# Patient Record
Sex: FEMALE | Race: WHITE | Employment: FULL TIME | URBAN - METROPOLITAN AREA
[De-identification: names, ages, dates, MRNs, and addresses within clinical notes are randomized per-mention and may not be internally consistent; named-entity substitution may affect disease eponyms.]

---

## 2022-08-14 ENCOUNTER — HOSPITAL ENCOUNTER (EMERGENCY)
Age: 27
Discharge: HOME | End: 2022-08-14
Attending: EMERGENCY MEDICINE
Payer: COMMERCIAL

## 2022-08-14 ENCOUNTER — APPOINTMENT (OUTPATIENT)
Dept: GENERAL RADIOLOGY | Age: 27
End: 2022-08-14
Payer: COMMERCIAL

## 2022-08-14 VITALS
BODY MASS INDEX: 43.34 KG/M2 | DIASTOLIC BLOOD PRESSURE: 65 MMHG | HEIGHT: 59 IN | OXYGEN SATURATION: 100 % | SYSTOLIC BLOOD PRESSURE: 110 MMHG | RESPIRATION RATE: 20 BRPM | TEMPERATURE: 97.7 F | WEIGHT: 215 LBS | HEART RATE: 64 BPM

## 2022-08-14 DIAGNOSIS — R19.7 DIARRHEA, UNSPECIFIED TYPE: Primary | ICD-10-CM

## 2022-08-14 DIAGNOSIS — K52.9 COLITIS: ICD-10-CM

## 2022-08-14 LAB
ALBUMIN SERPL-MCNC: 4 G/DL (ref 3.5–5.2)
ALP BLD-CCNC: 98 U/L (ref 35–104)
ALT SERPL-CCNC: 12 U/L (ref 0–32)
ANION GAP SERPL CALCULATED.3IONS-SCNC: 11 MMOL/L (ref 7–16)
AST SERPL-CCNC: 16 U/L (ref 0–31)
BACTERIA: ABNORMAL /HPF
BASOPHILS ABSOLUTE: 0.04 E9/L (ref 0–0.2)
BASOPHILS RELATIVE PERCENT: 0.5 % (ref 0–2)
BILIRUB SERPL-MCNC: 0.3 MG/DL (ref 0–1.2)
BILIRUBIN URINE: NEGATIVE
BLOOD, URINE: NEGATIVE
BUN BLDV-MCNC: 12 MG/DL (ref 6–20)
CALCIUM SERPL-MCNC: 9.6 MG/DL (ref 8.6–10.2)
CHLORIDE BLD-SCNC: 99 MMOL/L (ref 98–107)
CLARITY: CLEAR
CO2: 27 MMOL/L (ref 22–29)
COLOR: YELLOW
CREAT SERPL-MCNC: 0.7 MG/DL (ref 0.5–1)
EOSINOPHILS ABSOLUTE: 0.03 E9/L (ref 0.05–0.5)
EOSINOPHILS RELATIVE PERCENT: 0.4 % (ref 0–6)
EPITHELIAL CELLS, UA: ABNORMAL /HPF
GFR AFRICAN AMERICAN: >60
GFR NON-AFRICAN AMERICAN: >60 ML/MIN/1.73
GLUCOSE BLD-MCNC: 120 MG/DL (ref 74–99)
GLUCOSE URINE: NEGATIVE MG/DL
HCG, URINE, POC: NEGATIVE
HCT VFR BLD CALC: 43.9 % (ref 34–48)
HEMOGLOBIN: 14.3 G/DL (ref 11.5–15.5)
IMMATURE GRANULOCYTES #: 0.02 E9/L
IMMATURE GRANULOCYTES %: 0.2 % (ref 0–5)
KETONES, URINE: NEGATIVE MG/DL
LEUKOCYTE ESTERASE, URINE: NEGATIVE
LYMPHOCYTES ABSOLUTE: 1.86 E9/L (ref 1.5–4)
LYMPHOCYTES RELATIVE PERCENT: 21.8 % (ref 20–42)
Lab: NORMAL
MCH RBC QN AUTO: 30 PG (ref 26–35)
MCHC RBC AUTO-ENTMCNC: 32.6 % (ref 32–34.5)
MCV RBC AUTO: 92.2 FL (ref 80–99.9)
MONOCYTES ABSOLUTE: 0.46 E9/L (ref 0.1–0.95)
MONOCYTES RELATIVE PERCENT: 5.4 % (ref 2–12)
NEGATIVE QC PASS/FAIL: NORMAL
NEUTROPHILS ABSOLUTE: 6.14 E9/L (ref 1.8–7.3)
NEUTROPHILS RELATIVE PERCENT: 71.7 % (ref 43–80)
NITRITE, URINE: NEGATIVE
PDW BLD-RTO: 12.8 FL (ref 11.5–15)
PH UA: 5.5 (ref 5–9)
PLATELET # BLD: 259 E9/L (ref 130–450)
PMV BLD AUTO: 9.8 FL (ref 7–12)
POSITIVE QC PASS/FAIL: NORMAL
POTASSIUM SERPL-SCNC: 3.8 MMOL/L (ref 3.5–5)
PROTEIN UA: NEGATIVE MG/DL
RBC # BLD: 4.76 E12/L (ref 3.5–5.5)
RBC UA: ABNORMAL /HPF (ref 0–2)
SODIUM BLD-SCNC: 137 MMOL/L (ref 132–146)
SPECIFIC GRAVITY UA: >=1.03 (ref 1–1.03)
TOTAL PROTEIN: 7.8 G/DL (ref 6.4–8.3)
UROBILINOGEN, URINE: 0.2 E.U./DL
WBC # BLD: 8.6 E9/L (ref 4.5–11.5)
WBC UA: ABNORMAL /HPF (ref 0–5)

## 2022-08-14 PROCEDURE — 81001 URINALYSIS AUTO W/SCOPE: CPT

## 2022-08-14 PROCEDURE — 6360000002 HC RX W HCPCS: Performed by: EMERGENCY MEDICINE

## 2022-08-14 PROCEDURE — 85025 COMPLETE CBC W/AUTO DIFF WBC: CPT

## 2022-08-14 PROCEDURE — 99284 EMERGENCY DEPT VISIT MOD MDM: CPT

## 2022-08-14 PROCEDURE — 80053 COMPREHEN METABOLIC PANEL: CPT

## 2022-08-14 PROCEDURE — 74022 RADEX COMPL AQT ABD SERIES: CPT

## 2022-08-14 PROCEDURE — 96374 THER/PROPH/DIAG INJ IV PUSH: CPT

## 2022-08-14 RX ORDER — KETOROLAC TROMETHAMINE 30 MG/ML
30 INJECTION, SOLUTION INTRAMUSCULAR; INTRAVENOUS ONCE
Status: COMPLETED | OUTPATIENT
Start: 2022-08-14 | End: 2022-08-14

## 2022-08-14 RX ORDER — PREDNISONE 50 MG/1
50 TABLET ORAL DAILY
Qty: 4 TABLET | Refills: 0 | Status: SHIPPED | OUTPATIENT
Start: 2022-08-14 | End: 2022-08-18

## 2022-08-14 RX ADMIN — KETOROLAC TROMETHAMINE 30 MG: 30 INJECTION, SOLUTION INTRAMUSCULAR; INTRAVENOUS at 06:51

## 2022-08-14 ASSESSMENT — ENCOUNTER SYMPTOMS
NAUSEA: 0
DIARRHEA: 1
COUGH: 0
ABDOMINAL PAIN: 1
SHORTNESS OF BREATH: 0
WHEEZING: 0
BLOOD IN STOOL: 1
ABDOMINAL DISTENTION: 0
CHEST TIGHTNESS: 0
SORE THROAT: 0
VOMITING: 0
BACK PAIN: 0

## 2022-08-14 ASSESSMENT — PAIN SCALES - GENERAL: PAINLEVEL_OUTOF10: 3

## 2022-08-14 ASSESSMENT — PAIN - FUNCTIONAL ASSESSMENT: PAIN_FUNCTIONAL_ASSESSMENT: 0-10

## 2022-08-14 ASSESSMENT — LIFESTYLE VARIABLES: HOW OFTEN DO YOU HAVE A DRINK CONTAINING ALCOHOL: NEVER

## 2022-08-14 ASSESSMENT — PAIN DESCRIPTION - LOCATION: LOCATION: ABDOMEN

## 2022-08-14 NOTE — ED NOTES
Discussed with the patient and all questioned fully answered. She will call me if any problems arise.      Angel Martínez RN  08/14/22 [id number]

## 2022-08-14 NOTE — ED PROVIDER NOTES
Chief complaint:  Diarrhea    HPI history provided by the patient and family  Patient is here with a history of IBS, she has had colonoscopies. Woke up around 2 or 3 AM and had abdominal cramping and diarrhea. She states she had 4 or 5 bouts of diarrhea throughout the night and the last couple had blood in them. Denies abdominal pain at this time stating everything feels much better right now. No nausea or vomiting. No flank pain. No dysuria. No hematuria. No fevers, sweats or chills associated with this. Did take some Tylenol earlier in the evening for this with some apparent improvement. Review of Systems   Constitutional:  Negative for chills, diaphoresis, fatigue and fever. HENT:  Negative for congestion and sore throat. Respiratory:  Negative for cough, chest tightness, shortness of breath and wheezing. Cardiovascular:  Negative for chest pain, palpitations and leg swelling. Gastrointestinal:  Positive for abdominal pain, blood in stool and diarrhea. Negative for abdominal distention, nausea and vomiting. Genitourinary:  Negative for dysuria, flank pain, frequency, hematuria and urgency. Musculoskeletal:  Negative for arthralgias, back pain, gait problem, joint swelling, myalgias, neck pain and neck stiffness. Skin:  Negative for rash and wound. Neurological:  Negative for dizziness, seizures, syncope, weakness, light-headedness, numbness and headaches. All other systems reviewed and are negative. Physical Exam  Vitals and nursing note reviewed. Constitutional:       General: She is not in acute distress. Appearance: She is well-developed. She is not ill-appearing, toxic-appearing or diaphoretic. HENT:      Head: Normocephalic and atraumatic. Mouth/Throat:      Pharynx: Oropharynx is clear. Uvula midline. Eyes:      General: No scleral icterus. Pupils: Pupils are equal, round, and reactive to light.       Comments: Pink conjunctive a   Neck:      Trachea: Trachea and phonation normal.   Cardiovascular:      Rate and Rhythm: Normal rate and regular rhythm. Heart sounds: Normal heart sounds. No murmur heard. Pulmonary:      Effort: Pulmonary effort is normal. No respiratory distress. Breath sounds: Normal breath sounds. No stridor, decreased air movement or transmitted upper airway sounds. No decreased breath sounds, wheezing, rhonchi or rales. Chest:      Chest wall: No tenderness. Abdominal:      General: Bowel sounds are normal. There is no distension. Palpations: Abdomen is soft. Tenderness: There is no abdominal tenderness. There is no right CVA tenderness, left CVA tenderness, guarding or rebound. Comments: Abdomen soft in all 4 quadrants on palpation with no tenderness throughout palpation in all 4 quadrants. No guarding, rebound tenderness or rigidity. No distention. No CVA tenderness. Musculoskeletal:         General: No swelling, tenderness, deformity or signs of injury. Cervical back: Full passive range of motion without pain, normal range of motion and neck supple. Right lower leg: No edema. Left lower leg: No edema. Comments: Arms and legs are neurovascular intact. No pretibial edema or calf pain. Skin:     General: Skin is warm and dry. Coloration: Skin is not cyanotic, jaundiced, mottled or pale. Findings: No bruising, erythema or rash. Neurological:      General: No focal deficit present. Mental Status: She is alert and oriented to person, place, and time. GCS: GCS eye subscore is 4. GCS verbal subscore is 5. GCS motor subscore is 6. Cranial Nerves: No cranial nerve deficit. Coordination: Coordination normal.        Procedures     [facility]       ED Course as of 08/14/22 0818   Sun Aug 14, 2022   1450 Patient sitting the bed resting comfortably no distress. Updated on work-up results as well as moderate stool on her x-ray.   We will start her on some steroids for mild colitis however she is going to take some MiraLAX to try to clean the moderate stool burden. She is going to follow closely with her doctor and GI. She will return if symptoms change or worsen. [NC]      ED Course User Index  [NC] Genie Ford DO        ED Course as of 08/14/22 0818   Sun Aug 14, 2022   0816 Patient sitting the bed resting comfortably no distress. Updated on work-up results as well as moderate stool on her x-ray. We will start her on some steroids for mild colitis however she is going to take some MiraLAX to try to clean the moderate stool burden. She is going to follow closely with her doctor and GI. She will return if symptoms change or worsen. [NC]      ED Course User Index  [NC] Genie Ford DO       --------------------------------------------- PAST HISTORY ---------------------------------------------  Past Medical History:  has no past medical history on file. Past Surgical History:  has no past surgical history on file. Social History:  reports that she has never smoked. She has never been exposed to tobacco smoke. She has never used smokeless tobacco. She reports that she does not drink alcohol and does not use drugs. Family History: family history is not on file. The patients home medications have been reviewed. Allergies: Patient has no known allergies.     -------------------------------------------------- RESULTS -------------------------------------------------  Labs:  Results for orders placed or performed during the hospital encounter of 08/14/22   CBC with Auto Differential   Result Value Ref Range    WBC 8.6 4.5 - 11.5 E9/L    RBC 4.76 3.50 - 5.50 E12/L    Hemoglobin 14.3 11.5 - 15.5 g/dL    Hematocrit 43.9 34.0 - 48.0 %    MCV 92.2 80.0 - 99.9 fL    MCH 30.0 26.0 - 35.0 pg    MCHC 32.6 32.0 - 34.5 %    RDW 12.8 11.5 - 15.0 fL    Platelets 861 501 - 085 E9/L    MPV 9.8 7.0 - 12.0 fL    Neutrophils % 71.7 43.0 - 80.0 %    Immature Granulocytes % 0.2 0.0 - 5.0 %    Lymphocytes % 21.8 20.0 - 42.0 %    Monocytes % 5.4 2.0 - 12.0 %    Eosinophils % 0.4 0.0 - 6.0 %    Basophils % 0.5 0.0 - 2.0 %    Neutrophils Absolute 6.14 1.80 - 7.30 E9/L    Immature Granulocytes # 0.02 E9/L    Lymphocytes Absolute 1.86 1.50 - 4.00 E9/L    Monocytes Absolute 0.46 0.10 - 0.95 E9/L    Eosinophils Absolute 0.03 (L) 0.05 - 0.50 E9/L    Basophils Absolute 0.04 0.00 - 0.20 E9/L   Comprehensive Metabolic Panel   Result Value Ref Range    Sodium 137 132 - 146 mmol/L    Potassium 3.8 3.5 - 5.0 mmol/L    Chloride 99 98 - 107 mmol/L    CO2 27 22 - 29 mmol/L    Anion Gap 11 7 - 16 mmol/L    Glucose 120 (H) 74 - 99 mg/dL    BUN 12 6 - 20 mg/dL    Creatinine 0.7 0.5 - 1.0 mg/dL    GFR Non-African American >60 >=60 mL/min/1.73    GFR African American >60     Calcium 9.6 8.6 - 10.2 mg/dL    Total Protein 7.8 6.4 - 8.3 g/dL    Albumin 4.0 3.5 - 5.2 g/dL    Total Bilirubin 0.3 0.0 - 1.2 mg/dL    Alkaline Phosphatase 98 35 - 104 U/L    ALT 12 0 - 32 U/L    AST 16 0 - 31 U/L   Urinalysis with Microscopic   Result Value Ref Range    Color, UA Yellow Straw/Yellow    Clarity, UA Clear Clear    Glucose, Ur Negative Negative mg/dL    Bilirubin Urine Negative Negative    Ketones, Urine Negative Negative mg/dL    Specific Gravity, UA >=1.030 1.005 - 1.030    Blood, Urine Negative Negative    pH, UA 5.5 5.0 - 9.0    Protein, UA Negative Negative mg/dL    Urobilinogen, Urine 0.2 <2.0 E.U./dL    Nitrite, Urine Negative Negative    Leukocyte Esterase, Urine Negative Negative    WBC, UA 0-1 0 - 5 /HPF    RBC, UA NONE 0 - 2 /HPF    Epithelial Cells, UA RARE /HPF    Bacteria, UA RARE (A) None Seen /HPF   POC Pregnancy Urine Qual   Result Value Ref Range    HCG, Urine, POC Negative Negative    Lot Number [id number]     Positive QC Pass/Fail Acceptable     Negative QC Pass/Fail Acceptable        Radiology:  XR ACUTE ABD SERIES CHEST 1 VW   Final Result   Chest is normal      Abdomen reveals nonobstructive bowel gas pattern. Mild colonic stool burden   without significant involvement or abnormal features. ------------------------- NURSING NOTES AND VITALS REVIEWED ---------------------------  Date / Time Roomed:  8/14/2022  5:43 AM  ED Bed Assignment:  07/07    The nursing notes within the ED encounter and vital signs as below have been reviewed. BP (!) 151/91   Pulse 76   Temp 97.7 °F (36.5 °C) (Tympanic)   Resp 18   Ht 4' 11\" (1.499 m)   Wt 215 lb (97.5 kg)   LMP 07/27/2022   SpO2 100%   BMI 43.42 kg/m²   Oxygen Saturation Interpretation: Normal      ------------------------------------------ PROGRESS NOTES ------------------------------------------  I have spoken with the patient and discussed todays results, in addition to providing specific details for the plan of care and counseling regarding the diagnosis and prognosis. Their questions are answered at this time and they are agreeable with the plan. I discussed at length with them reasons for immediate return here for re evaluation. They will followup with primary care by calling their office tomorrow. --------------------------------- ADDITIONAL PROVIDER NOTES ---------------------------------  At this time the patient is without objective evidence of an acute process requiring hospitalization or inpatient management. They have remained hemodynamically stable throughout their entire ED visit and are stable for discharge with outpatient follow-up. The plan has been discussed in detail and they are aware of the specific conditions for emergent return, as well as the importance of follow-up. New Prescriptions    PREDNISONE (DELTASONE) 50 MG TABLET    Take 1 tablet by mouth in the morning for 4 days. Diagnosis:  1. Diarrhea, unspecified type    2. Colitis        Disposition:  Patient's disposition: Discharge to home  Patient's condition is stable.          Gogo Aburto DO  08/14/22 6308

## 2023-01-20 RX ORDER — ACETAMINOPHEN 500 MG
500 TABLET ORAL EVERY 6 HOURS PRN
COMMUNITY

## 2023-01-20 RX ORDER — LORATADINE 10 MG/1
10 CAPSULE, LIQUID FILLED ORAL DAILY PRN
COMMUNITY

## 2023-01-20 NOTE — PROGRESS NOTES
[address] [facility]                                                                                                                    PRE OP INSTRUCTIONS FOR  Norberto Pain        Date: 1/20/2023    Date of surgery: 1/23/23   Arrival Time: Hospital will call you between 5pm and 7pm with your final arrival time for surgery    Do not eat or drink anything after midnight prior to surgery. This includes no water, chewing gum, mints or ice chips. Take the following medications with a small sip of water on the morning of Surgery: none     Diabetics may take evening dose of insulin but none after midnight. If you feel symptomatic or low blood sugar morning of surgery drink 1-2 ounces of apple juice only. Aspirin, Ibuprofen, Advil, Naproxen, Vitamin E and other Anti-inflammatory products should be stopped  before surgery  as directed by your physician. Take Tylenol only unless instructed otherwise by your surgeon. Check with your Doctor regarding stopping Plavix, Coumadin, Lovenox, Eliquis, Effient, or other blood thinners. Do not smoke,use illicit drugs and do not drink any alcoholic beverages 24 hours prior to surgery. You may brush your teeth the morning of surgery. DO NOT SWALLOW WATER    You MUST make arrangements for a responsible adult to take you home after your surgery. You will not be allowed to leave alone or drive yourself home. It is strongly suggested someone stay with you the first 24 hrs. Your surgery will be cancelled if you do not have a ride home. PEDIATRIC PATIENTS ONLY:  A parent/legal guardian must accompany a child scheduled for surgery and plan to stay at the hospital until the child is discharged. Please do not bring other children with you. Please wear simple, loose fitting clothing to the hospital.  Janice Augustinbles not bring valuables (money, credit cards, checkbooks, etc.) Do not wear any makeup (including no eye makeup) or nail polish on your fingers or toes.     DO NOT wear any jewelry or piercings on day of surgery.  All body piercing jewelry must be removed.    Shower the night before surgery with _x__Antibacterial soap /ANNETTE WIPES________    TOTAL JOINT REPLACEMENT/HYSTERECTOMY PATIENTS ONLY---Remember to bring Blood Bank bracelet to the hospital on the day of surgery.    If you have a Living Will and Durable Power of  for Healthcare, please bring in a copy.    If appropriate bring crutches, inspirex, WALKER, CANE etc...    Notify your Surgeon if you develop any illness between now and surgery time, cough, cold, fever, sore throat, nausea, vomiting, etc.  Please notify your surgeon if you experience dizziness, shortness of breath or blurred vision between now & the time of your surgery.    If you have ___dentures, they will be removed before going to the OR; we will provide you a container. If you wear ___contact lenses or ___glasses, they will be removed; please bring a case for them.    To provide excellent care visitors will be limited to 2 in the room at any given time.    Please bring picture ID and insurance card.    Sleep apnea patients need to bring CPAP AND SETTINGS to hospital on day of surgery.                                                                                         During flu season no children under the age of 14 are permitted in the hospital for the safety of all patients.     Other                   Please call AMBULATORY CARE if you have any further questions.   Pre Admit Testing           [phone]     [facility] [phone]

## 2023-01-23 ENCOUNTER — ANESTHESIA (OUTPATIENT)
Dept: OPERATING ROOM | Age: 28
End: 2023-01-23
Payer: COMMERCIAL

## 2023-01-23 ENCOUNTER — HOSPITAL ENCOUNTER (OUTPATIENT)
Age: 28
Setting detail: OUTPATIENT SURGERY
Discharge: HOME | End: 2023-01-23
Attending: LEGAL MEDICINE | Admitting: LEGAL MEDICINE
Payer: COMMERCIAL

## 2023-01-23 ENCOUNTER — ANESTHESIA EVENT (OUTPATIENT)
Dept: OPERATING ROOM | Age: 28
End: 2023-01-23
Payer: COMMERCIAL

## 2023-01-23 VITALS
HEART RATE: 83 BPM | BODY MASS INDEX: 42.13 KG/M2 | TEMPERATURE: 96.8 F | HEIGHT: 59 IN | OXYGEN SATURATION: 99 % | RESPIRATION RATE: 20 BRPM | SYSTOLIC BLOOD PRESSURE: 125 MMHG | WEIGHT: 209 LBS | DIASTOLIC BLOOD PRESSURE: 82 MMHG

## 2023-01-23 DIAGNOSIS — N92.0 EXCESSIVE OR FREQUENT MENSTRUATION: ICD-10-CM

## 2023-01-23 LAB
ABO/RH: NORMAL
ANTIBODY SCREEN: NORMAL
HCG QUALITATIVE: NEGATIVE

## 2023-01-23 PROCEDURE — 3700000001 HC ADD 15 MINUTES (ANESTHESIA): Performed by: LEGAL MEDICINE

## 2023-01-23 PROCEDURE — 2580000003 HC RX 258: Performed by: LEGAL MEDICINE

## 2023-01-23 PROCEDURE — 6360000002 HC RX W HCPCS: Performed by: LEGAL MEDICINE

## 2023-01-23 PROCEDURE — 2709999900 HC NON-CHARGEABLE SUPPLY: Performed by: LEGAL MEDICINE

## 2023-01-23 PROCEDURE — 88305 TISSUE EXAM BY PATHOLOGIST: CPT

## 2023-01-23 PROCEDURE — 86900 BLOOD TYPING SEROLOGIC ABO: CPT

## 2023-01-23 PROCEDURE — 6360000002 HC RX W HCPCS: Performed by: ANESTHESIOLOGIST ASSISTANT

## 2023-01-23 PROCEDURE — 7100000011 HC PHASE II RECOVERY - ADDTL 15 MIN: Performed by: LEGAL MEDICINE

## 2023-01-23 PROCEDURE — 36415 COLL VENOUS BLD VENIPUNCTURE: CPT

## 2023-01-23 PROCEDURE — 3700000000 HC ANESTHESIA ATTENDED CARE: Performed by: LEGAL MEDICINE

## 2023-01-23 PROCEDURE — 86901 BLOOD TYPING SEROLOGIC RH(D): CPT

## 2023-01-23 PROCEDURE — 2720000010 HC SURG SUPPLY STERILE: Performed by: LEGAL MEDICINE

## 2023-01-23 PROCEDURE — 7100000001 HC PACU RECOVERY - ADDTL 15 MIN: Performed by: LEGAL MEDICINE

## 2023-01-23 PROCEDURE — 84703 CHORIONIC GONADOTROPIN ASSAY: CPT

## 2023-01-23 PROCEDURE — 3600000013 HC SURGERY LEVEL 3 ADDTL 15MIN: Performed by: LEGAL MEDICINE

## 2023-01-23 PROCEDURE — 6360000002 HC RX W HCPCS

## 2023-01-23 PROCEDURE — 86850 RBC ANTIBODY SCREEN: CPT

## 2023-01-23 PROCEDURE — 2580000003 HC RX 258: Performed by: ANESTHESIOLOGY

## 2023-01-23 PROCEDURE — 7100000010 HC PHASE II RECOVERY - FIRST 15 MIN: Performed by: LEGAL MEDICINE

## 2023-01-23 PROCEDURE — 3600000003 HC SURGERY LEVEL 3 BASE: Performed by: LEGAL MEDICINE

## 2023-01-23 PROCEDURE — 7100000000 HC PACU RECOVERY - FIRST 15 MIN: Performed by: LEGAL MEDICINE

## 2023-01-23 RX ORDER — SODIUM CHLORIDE 0.9 % (FLUSH) 0.9 %
5-40 SYRINGE (ML) INJECTION EVERY 12 HOURS SCHEDULED
Status: DISCONTINUED | OUTPATIENT
Start: 2023-01-23 | End: 2023-01-23 | Stop reason: HOSPADM

## 2023-01-23 RX ORDER — METHOCARBAMOL 100 MG/ML
INJECTION, SOLUTION INTRAMUSCULAR; INTRAVENOUS
Status: COMPLETED
Start: 2023-01-23 | End: 2023-01-23

## 2023-01-23 RX ORDER — ONDANSETRON 2 MG/ML
4 INJECTION INTRAMUSCULAR; INTRAVENOUS
Status: DISCONTINUED | OUTPATIENT
Start: 2023-01-23 | End: 2023-01-23 | Stop reason: HOSPADM

## 2023-01-23 RX ORDER — PROPOFOL 10 MG/ML
INJECTION, EMULSION INTRAVENOUS PRN
Status: DISCONTINUED | OUTPATIENT
Start: 2023-01-23 | End: 2023-01-23 | Stop reason: SDUPTHER

## 2023-01-23 RX ORDER — SODIUM CHLORIDE 9 MG/ML
INJECTION, SOLUTION INTRAVENOUS PRN
Status: DISCONTINUED | OUTPATIENT
Start: 2023-01-23 | End: 2023-01-23 | Stop reason: HOSPADM

## 2023-01-23 RX ORDER — MEPERIDINE HYDROCHLORIDE 25 MG/ML
12.5 INJECTION INTRAMUSCULAR; INTRAVENOUS; SUBCUTANEOUS EVERY 5 MIN PRN
Status: DISCONTINUED | OUTPATIENT
Start: 2023-01-23 | End: 2023-01-23 | Stop reason: HOSPADM

## 2023-01-23 RX ORDER — SODIUM CHLORIDE, SODIUM LACTATE, POTASSIUM CHLORIDE, CALCIUM CHLORIDE 600; 310; 30; 20 MG/100ML; MG/100ML; MG/100ML; MG/100ML
INJECTION, SOLUTION INTRAVENOUS CONTINUOUS
Status: DISCONTINUED | OUTPATIENT
Start: 2023-01-23 | End: 2023-01-23 | Stop reason: HOSPADM

## 2023-01-23 RX ORDER — ACETAMINOPHEN 500 MG
1000 TABLET ORAL EVERY 8 HOURS PRN
OUTPATIENT
Start: 2023-01-23

## 2023-01-23 RX ORDER — FENTANYL CITRATE 50 UG/ML
50 INJECTION, SOLUTION INTRAMUSCULAR; INTRAVENOUS EVERY 5 MIN PRN
Status: DISCONTINUED | OUTPATIENT
Start: 2023-01-23 | End: 2023-01-23 | Stop reason: HOSPADM

## 2023-01-23 RX ORDER — SODIUM CHLORIDE 0.9 % (FLUSH) 0.9 %
5-40 SYRINGE (ML) INJECTION EVERY 12 HOURS SCHEDULED
OUTPATIENT
Start: 2023-01-23

## 2023-01-23 RX ORDER — LIDOCAINE HYDROCHLORIDE 20 MG/ML
INJECTION, SOLUTION INTRAVENOUS PRN
Status: DISCONTINUED | OUTPATIENT
Start: 2023-01-23 | End: 2023-01-23 | Stop reason: SDUPTHER

## 2023-01-23 RX ORDER — SODIUM CHLORIDE 0.9 % (FLUSH) 0.9 %
5-40 SYRINGE (ML) INJECTION PRN
Status: DISCONTINUED | OUTPATIENT
Start: 2023-01-23 | End: 2023-01-23 | Stop reason: HOSPADM

## 2023-01-23 RX ORDER — FENTANYL CITRATE 50 UG/ML
25 INJECTION, SOLUTION INTRAMUSCULAR; INTRAVENOUS EVERY 5 MIN PRN
Status: DISCONTINUED | OUTPATIENT
Start: 2023-01-23 | End: 2023-01-23 | Stop reason: HOSPADM

## 2023-01-23 RX ORDER — SODIUM CHLORIDE 0.9 % (FLUSH) 0.9 %
5-40 SYRINGE (ML) INJECTION PRN
OUTPATIENT
Start: 2023-01-23

## 2023-01-23 RX ORDER — SODIUM CHLORIDE 9 MG/ML
INJECTION, SOLUTION INTRAVENOUS PRN
OUTPATIENT
Start: 2023-01-23

## 2023-01-23 RX ORDER — MIDAZOLAM HYDROCHLORIDE 1 MG/ML
INJECTION INTRAMUSCULAR; INTRAVENOUS PRN
Status: DISCONTINUED | OUTPATIENT
Start: 2023-01-23 | End: 2023-01-23 | Stop reason: SDUPTHER

## 2023-01-23 RX ORDER — METHOCARBAMOL 100 MG/ML
1000 INJECTION, SOLUTION INTRAMUSCULAR; INTRAVENOUS ONCE
Status: COMPLETED | OUTPATIENT
Start: 2023-01-23 | End: 2023-01-23

## 2023-01-23 RX ORDER — ONDANSETRON 2 MG/ML
INJECTION INTRAMUSCULAR; INTRAVENOUS PRN
Status: DISCONTINUED | OUTPATIENT
Start: 2023-01-23 | End: 2023-01-23 | Stop reason: SDUPTHER

## 2023-01-23 RX ORDER — SODIUM CHLORIDE, SODIUM LACTATE, POTASSIUM CHLORIDE, CALCIUM CHLORIDE 600; 310; 30; 20 MG/100ML; MG/100ML; MG/100ML; MG/100ML
INJECTION, SOLUTION INTRAVENOUS CONTINUOUS
OUTPATIENT
Start: 2023-01-23

## 2023-01-23 RX ORDER — FENTANYL CITRATE 50 UG/ML
INJECTION, SOLUTION INTRAMUSCULAR; INTRAVENOUS PRN
Status: DISCONTINUED | OUTPATIENT
Start: 2023-01-23 | End: 2023-01-23 | Stop reason: SDUPTHER

## 2023-01-23 RX ORDER — DEXAMETHASONE SODIUM PHOSPHATE 10 MG/ML
INJECTION, SOLUTION INTRAMUSCULAR; INTRAVENOUS PRN
Status: DISCONTINUED | OUTPATIENT
Start: 2023-01-23 | End: 2023-01-23 | Stop reason: SDUPTHER

## 2023-01-23 RX ADMIN — LIDOCAINE HYDROCHLORIDE 60 MG: 20 INJECTION, SOLUTION INTRAVENOUS at 08:13

## 2023-01-23 RX ADMIN — METHOCARBAMOL 1000 MG: 100 INJECTION, SOLUTION INTRAMUSCULAR; INTRAVENOUS at 09:29

## 2023-01-23 RX ADMIN — FENTANYL CITRATE 100 MCG: 50 INJECTION, SOLUTION INTRAMUSCULAR; INTRAVENOUS at 08:13

## 2023-01-23 RX ADMIN — SODIUM CHLORIDE, POTASSIUM CHLORIDE, SODIUM LACTATE AND CALCIUM CHLORIDE: 600; 310; 30; 20 INJECTION, SOLUTION INTRAVENOUS at 08:03

## 2023-01-23 RX ADMIN — CEFAZOLIN 2000 MG: 2 INJECTION, POWDER, FOR SOLUTION INTRAMUSCULAR; INTRAVENOUS at 08:15

## 2023-01-23 RX ADMIN — DEXAMETHASONE SODIUM PHOSPHATE 10 MG: 10 INJECTION, SOLUTION INTRAMUSCULAR; INTRAVENOUS at 08:13

## 2023-01-23 RX ADMIN — SODIUM CHLORIDE, POTASSIUM CHLORIDE, SODIUM LACTATE AND CALCIUM CHLORIDE: 600; 310; 30; 20 INJECTION, SOLUTION INTRAVENOUS at 06:30

## 2023-01-23 RX ADMIN — PROPOFOL 150 MG: 10 INJECTION, EMULSION INTRAVENOUS at 08:13

## 2023-01-23 RX ADMIN — ONDANSETRON 4 MG: 2 INJECTION INTRAMUSCULAR; INTRAVENOUS at 08:13

## 2023-01-23 RX ADMIN — MIDAZOLAM 2 MG: 1 INJECTION INTRAMUSCULAR; INTRAVENOUS at 08:08

## 2023-01-23 RX ADMIN — SODIUM CHLORIDE, POTASSIUM CHLORIDE, SODIUM LACTATE AND CALCIUM CHLORIDE: 600; 310; 30; 20 INJECTION, SOLUTION INTRAVENOUS at 08:41

## 2023-01-23 ASSESSMENT — PAIN DESCRIPTION - ORIENTATION: ORIENTATION: ANTERIOR

## 2023-01-23 ASSESSMENT — PAIN DESCRIPTION - LOCATION
LOCATION: ABDOMEN
LOCATION: ABDOMEN

## 2023-01-23 ASSESSMENT — PAIN DESCRIPTION - PAIN TYPE: TYPE: SURGICAL PAIN

## 2023-01-23 ASSESSMENT — PAIN SCALES - GENERAL
PAINLEVEL_OUTOF10: 2
PAINLEVEL_OUTOF10: 5
PAINLEVEL_OUTOF10: 3

## 2023-01-23 ASSESSMENT — PAIN DESCRIPTION - DESCRIPTORS
DESCRIPTORS: CRAMPING
DESCRIPTORS: PRESSURE

## 2023-01-23 ASSESSMENT — PAIN - FUNCTIONAL ASSESSMENT: PAIN_FUNCTIONAL_ASSESSMENT: NONE - DENIES PAIN

## 2023-01-23 NOTE — OP NOTE
[address]                                OPERATIVE REPORT    PATIENT NAME: Praful Ware                        :        1995  MED REC NO:   [id number]                            ROOM:  ACCOUNT NO:   [de-identified]                           ADMIT DATE: 2023  PROVIDER:     Jessica Villalta MD    DATE OF PROCEDURE:  2023    PREOPERATIVE DIAGNOSIS:  Menometrorrhagia. POSTOPERATIVE DIAGNOSES:  Menometrorrhagia as well as endometrial  polyps. PROCEDURES PERFORMED:  D and C hysteroscopy, endometrial polypectomy. SURGEON:  Jessica Villalta MD    ANESTHESIA:  General.    ESTIMATED BLOOD LOSS:  Minimal.    REPLACEMENT:  500. URINE OUTPUT:  100. FLUID DEFICIT:  50. FINDINGS:  Endometrial polyps. COMPLICATIONS:  None. COUNTS:  Sponge, lap, needle counts were correct. DISPOSITION:  The patient went to recovery room in stable condition. PROCEDURE IN DETAIL:  After informed consent was obtained, and under an  adequate level of general anesthesia, the patient was placed in proper  lithotomy position by the nurses. Care was made to assure that there  was moderate flexion at the knees and hips, minimal abduction, external  rotation at the hips. Care was made to assure that there was no undue  pressure on the calves or lateral fibular head. Vagina and perineum  were prepped with Betadine. She was draped with sterile drapes. She  was straight cath. Exam under anesthesia revealed an anteverted 10-week  size uterus. No adnexal masses were appreciated. A weighted speculum  was inserted into the vagina. The anterior lip of the cervix was  grasped and the uterus was sounded to 9 cm. The cervix was serially  dilated until it allowed insertion of a 7 mm hysteroscopic sheath and  obturator  Saline was used as the distending medium.   At no point was  the intrauterine pressure was allowed to exceed 100 mmHg. Both ostia  were visualized. MyoSure device was used to resect the polyps at their  base. Hysteroscopic instruments were removed. Sharp curettage was  performed in all four quadrants with moderate amount of benign-appearing  tissue obtained. All instruments were removed from the vagina. Minimal  bleeding was noted at the end of the procedure. There were no  complications. The patient tolerated the procedure well and went to  recovery room in stable condition.         Olga Moore MD    D: 01/23/2023 9:30:06       T: 01/23/2023 9:32:22     [email or web address]  Job#: [id number]     Doc#: [id number]    CC:

## 2023-01-23 NOTE — ANESTHESIA POSTPROCEDURE EVALUATION
Department of Anesthesiology  Postprocedure Note    Patient: Sravani Church  MRN: [id number]  YOB: 1995  Date of evaluation: 1/23/2023      Procedure Summary     Date: 01/23/23 Room / Location: [address] [place] [address] 03 / [facility]    Anesthesia Start: 0803 Anesthesia Stop: 5863    Procedure: DILATATION AND CURETTAGE HYSTEROSCOPY *MYOSURE DEVICE* (Uterus) Diagnosis:       Excessive or frequent menstruation      (Excessive or frequent menstruation [N92.0])    Surgeons: Justine Ann MD Responsible Provider: Sandro Barraza MD    Anesthesia Type: general ASA Status: 2          Anesthesia Type: No value filed.     Tony Phase I: Tony Score: 9    Tony Phase II: Tony Score: 10      Anesthesia Post Evaluation    Patient location during evaluation: PACU  Patient participation: complete - patient participated  Level of consciousness: awake  Pain score: 3  Airway patency: patent  Nausea & Vomiting: no nausea  Complications: no  Cardiovascular status: blood pressure returned to baseline  Respiratory status: acceptable  Hydration status: euvolemic

## 2023-01-23 NOTE — H&P
H+P no change. Updated. Blood pressure 113/66, pulse 87, temperature 98.6 °F (37 °C), temperature source Infrared, resp. rate 16, height 4' 11\" (1.499 m), weight 209 lb (94.8 kg), last menstrual period 01/06/2023, SpO2 97 %.

## 2023-01-23 NOTE — H&P
[place] [id number]                              HISTORY AND PHYSICAL    PATIENT NAME: USHA ANN                        :        1995  MED REC NO:   [id number]                            ROOM:  ACCOUNT NO:   [id number]                           ADMIT DATE: 2023...  PROVIDER:     Susan Castro MD    HISTORY OF PRESENT ILLNESS:  This is a 26-year-old white female with  complaints of menometrorrhagia.  Ultrasound demonstrates no endometrial  thickening.  The patient's BMI is 41.  Endometrial biopsy was performed  and was negative.  Options were reviewed, including expected management,  medical therapy versus surgical evaluation.  She has decided on surgical  evaluation.    PAST MEDICAL HISTORY:  Her past medical history is significant for  pulmonary nodules, asthma, and gastroesophageal reflux, possible IBS.    PAST SURGICAL HISTORY:  Esophageal atresia repair at 6 months of age.    PAST GYNECOLOGIC HISTORY:  No abnormal PAP, DVT, PE, STDs.  She has a  history of oligomenorrhea.    FAMILY HISTORY:  Grandmother with breast cancer at 60.  Father with  colon cancer at 52.    ALLERGIES:  No known drug allergies.    MEDICATIONS  None.    REVIEW OF SYSTEMS:  Negative for cardiac, respiratory, GI,   abnormalities.    PHYSICAL EXAMINATION:  VITAL SIGNS:  Stable.  Blood pressure 110/67, weight 211, height 5 feet,  BMI 41.  HEENT:  Negative.  LUNGS:  Clear to auscultation.  CV:  Regular rate and rhythm.  ABDOMEN:  Obese, soft, nondistended, nontender.  No masses.  PELVIC:  External genitalia within normal limits.  Vagina, no lesions.   Cervix, no lesions.  She has a cockscomb cervix.  Uterus anteverted, 8  weeks size, mobile and nontender.  Adnexa nontender.  No masses.  RECTOVAGINAL:  Deferred.  EXTREMITIES:  No clubbing, cyanosis, edema, cords, or erythema.    ASSESSMENT:  Patient with menometrorrhagia, declining  medical therapy or  expectant management. PLAN:  D and C hysteroscopy, other procedures as indicated by operative  findings. Risks of the procedure have been reviewed, including but not  limited to infection, bleeding, uterine perforation, injury to  surrounding organs, need for reoperation. She has been notified in the  event of excessive hemorrhage, she may require blood transfusion. All  her questions have been answered and she wishes to proceed with surgery.         Belén Guerra MD    D: 01/22/2023 13:30:31       T: 01/22/2023 13:32:50     [email or web address]  Job#: [id number]     Doc#: [id number]

## 2023-01-23 NOTE — DISCHARGE INSTRUCTIONS
POST OP HYSTEROSCOPY INSTRUCTIONS:    HOME WITH ROUTINE POST HYSTEROSCOPY PRECAUTIONS. NOTHING PER VAGINA AND NO TUB BATHS FOR 4 WEEKS. RTO 2/8 9 am.    [facility]  __________________________________________________    Discharge Instructions  [id number]    Patient Name:  Jenn Siddiqi      Date: 1/23/2023  Time:  10:45 AM      ___ Rest today; all patients that received a general anesthetic should not drive for 24 hours. ___ Keep dressing clean and dry. Suture removal will be arranged by your physician.    ___ Drink extra fluids (non-alcoholic) for the next 24 hours. ___ Refer to your physician's special handout.    ___ *Make an appointment to see your physician on     ___ Call office tomorrow to make follow-up arrangements.    ___ You need to have a responsible adult stay with you for the next 24 hours after surgery. ___ Medications as follows:***    ___ Refer to the medication handout.    ___ Gailen Cushing / Shower Instructions    ___ Special Instructions specific to your care as follow: carmen Matthew with any questions or concerns  Activity - Limited for two (2) weeks with no heavy lifting or strenuous exercise. Do not travel for four (4) weeks after surgery. Incision Care - Tubal Ligation/Laparoscopy: May shower in three (3) days, then replace bandages       Call the office for removal of the stutures    Special Instructions:       No tampons, douches, or sexual relations until check-up in office four (4) weeks. D&C May shower the following day; tub bath in four to five days after surgery. No swimming for two (2) weeks after surgery. May have vaginal bleeding on and off for a few weeks.        COMMENTS: Your recovery is expected to be uneventful; however you may or may not experience mild to moderate discomfort after being discharged from the hospital. As instructed by your physician, if you begin to experience increasing pain, excessive swelling, bleeding from the area of you operation, saturation of the dressing, or any other unusual event as outlined by your physician, contact your doctor. If you are not able to reach your physician, the hospital Emergency Room is available 24 hours a day. I HAVE READ AND UNDERSTAND THE ABOVE INSTRUCTIONS. EMERGENCY DEPT.   SIGNATURE ___________________ RN/PHYSICIAN  [address] ___________________ PATIENT  [phone]    SIGNATURE ___________________ RESPONSIBLE ADULT

## 2023-01-23 NOTE — PROGRESS NOTES
1/23/23 1100 reviewed discharge instructions with pt. Pt verbalized understanding,signed in agreement and given copy.  Kmo rn

## 2023-01-23 NOTE — ANESTHESIA PRE PROCEDURE
Department of Anesthesiology  Preprocedure Note       Name:  Sherry Mederos   Age:  32 y.o.  :  1995                                          MRN:  [id number]         Date:  2023      Surgeon: Ale Black):  Moon Guzman MD    Procedure: Procedure(s):  DILATATION AND CURETTAGE HYSTEROSCOPY *MYOSURE DEVICE*    Medications prior to admission:   Prior to Admission medications    Medication Sig Start Date End Date Taking? Authorizing Provider   acetaminophen (TYLENOL) 500 MG tablet Take 500 mg by mouth every 6 hours as needed for Pain   Yes Historical Provider, MD   loratadine (CLARITIN) 10 MG capsule Take 10 mg by mouth daily as needed   Yes Historical Provider, MD       Current medications:    Current Facility-Administered Medications   Medication Dose Route Frequency Provider Last Rate Last Admin    lactated ringers IV soln infusion   IntraVENous Continuous Kandis Henning MD        lactated ringers IV soln infusion   IntraVENous Continuous Moon Guzman MD        sodium chloride flush 0.9 % injection 5-40 mL  5-40 mL IntraVENous 2 times per day Moon Guzman MD        sodium chloride flush 0.9 % injection 5-40 mL  5-40 mL IntraVENous PRN Moon Guzman MD        0.9 % sodium chloride infusion   IntraVENous PRN Moon Guzman MD        ceFAZolin (ANCEF) 2,000 mg in sterile water 20 mL IV syringe  2,000 mg IntraVENous On Call to [address] [place] [address], MD           Allergies:  No Known Allergies    Problem List:  There is no problem list on file for this patient. Past Medical History:        Diagnosis Date    Asthma     Lung nodule        Past Surgical History:        Procedure Laterality Date    ESOPHAGEAL DILATATION      OTHER SURGICAL HISTORY      as a , attach esophagus, lungs and stomach; numerous surgeries since to fix areas for adhesions per pt.        Social History:    Social History     Tobacco Use    Smoking status: Never     Passive exposure: Never    Smokeless tobacco: Never Substance Use Topics    Alcohol use: Yes     Comment: occas. Counseling given: Not Answered      Vital Signs (Current):   Vitals:    01/20/23 0925 01/23/23 0615   BP:  113/66   Pulse:  87   Resp:  16   Temp:  98.6 °F (37 °C)   TempSrc:  Infrared   SpO2:  97%   Weight: 200 lb (90.7 kg) 209 lb (94.8 kg)   Height: 4' 11\" (1.499 m) 4' 11\" (1.499 m)                                              BP Readings from Last 3 Encounters:   01/23/23 113/66   08/14/22 110/65       NPO Status: Time of last liquid consumption: 0001                        Time of last solid consumption: 1900                        Date of last liquid consumption: 01/23/23                        Date of last solid food consumption: 01/22/23    BMI:   Wt Readings from Last 3 Encounters:   01/23/23 209 lb (94.8 kg)   08/14/22 215 lb (97.5 kg)     Body mass index is 42.21 kg/m². CBC:   Lab Results   Component Value Date/Time    WBC 8.6 08/14/2022 06:28 AM    RBC 4.76 08/14/2022 06:28 AM    HGB 14.3 08/14/2022 06:28 AM    HCT 43.9 08/14/2022 06:28 AM    MCV 92.2 08/14/2022 06:28 AM    RDW 12.8 08/14/2022 06:28 AM     08/14/2022 06:28 AM       CMP:   Lab Results   Component Value Date/Time     08/14/2022 06:28 AM    K 3.8 08/14/2022 06:28 AM    CL 99 08/14/2022 06:28 AM    CO2 27 08/14/2022 06:28 AM    BUN 12 08/14/2022 06:28 AM    CREATININE 0.7 08/14/2022 06:28 AM    GFRAA >60 08/14/2022 06:28 AM    LABGLOM >60 08/14/2022 06:28 AM    GLUCOSE 120 08/14/2022 06:28 AM    PROT 7.8 08/14/2022 06:28 AM    CALCIUM 9.6 08/14/2022 06:28 AM    BILITOT 0.3 08/14/2022 06:28 AM    ALKPHOS 98 08/14/2022 06:28 AM    AST 16 08/14/2022 06:28 AM    ALT 12 08/14/2022 06:28 AM       POC Tests: No results for input(s): POCGLU, POCNA, POCK, POCCL, POCBUN, POCHEMO, POCHCT in the last 72 hours.     Coags: No results found for: PROTIME, INR, APTT    HCG (If Applicable): No results found for: PREGTESTUR, PREGSERUM, HCG, HCGQUANT     ABGs: No results found for: PHART, PO2ART, RCD0BFW, TLF2GKN, BEART, D2JEZFJS     Type & Screen (If Applicable):  No results found for: LABABO, LABRH    Drug/Infectious Status (If Applicable):  No results found for: HIV, HEPCAB    COVID-19 Screening (If Applicable): No results found for: COVID19        Anesthesia Evaluation  Patient summary reviewed  Airway: Mallampati: II  TM distance: >3 FB   Neck ROM: full  Mouth opening: > = 3 FB   Dental: normal exam         Pulmonary: breath sounds clear to auscultation  (+) asthma:                            Cardiovascular:Negative CV ROS            Rhythm: regular  Rate: normal                    Neuro/Psych:   Negative Neuro/Psych ROS              GI/Hepatic/Renal: Neg GI/Hepatic/Renal ROS            Endo/Other: Negative Endo/Other ROS                    Abdominal:       Abdomen: soft.      Vascular:          Other Findings:           Anesthesia Plan      general     ASA 2       Induction: intravenous.      Anesthetic plan and risks discussed with patient.      Plan discussed with CRNA.                    ANDREA MONTELONGO MD   1/23/2023

## 2025-03-28 ENCOUNTER — TRANSCRIBE ORDERS (OUTPATIENT)
Dept: ADMINISTRATIVE | Age: 30
End: 2025-03-28

## 2025-03-28 DIAGNOSIS — J98.4 OTHER DISORDERS OF LUNG: ICD-10-CM

## 2025-03-28 DIAGNOSIS — R05.3 CHRONIC COUGH: Primary | ICD-10-CM

## 2025-06-10 ENCOUNTER — INITIAL CONSULT (OUTPATIENT)
Dept: SURGERY | Age: 30
End: 2025-06-10
Payer: COMMERCIAL

## 2025-06-10 VITALS
SYSTOLIC BLOOD PRESSURE: 122 MMHG | HEART RATE: 86 BPM | DIASTOLIC BLOOD PRESSURE: 86 MMHG | TEMPERATURE: 97.8 F | RESPIRATION RATE: 18 BRPM | HEIGHT: 59 IN | WEIGHT: 190 LBS | BODY MASS INDEX: 38.3 KG/M2

## 2025-06-10 DIAGNOSIS — R13.19 ESOPHAGEAL DYSPHAGIA: Primary | ICD-10-CM

## 2025-06-10 PROCEDURE — 99204 OFFICE O/P NEW MOD 45 MIN: CPT | Performed by: SURGERY

## 2025-06-10 NOTE — H&P (VIEW-ONLY)
General Surgery History and Physical  [facility]    Patient's Name/Date of Birth: Adela Bliss / 1995    Date: Alondra 10, 2025     Surgeon: Sreekanth Rico M.D.    PCP: Marilin Mclaughlin APRN - NP     Chief Complaint: Dysphagia, GERD    HPI:   Adela Bliss is a 29 y.o. female who presents for evaluation of dysphagia/GERD.  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  She is needed multiple EGDs with dilations.  Last EGD was in  she believes where she was found to have a hiatal hernia but no dilation was performed due to coughing.  She states she intermittently has some dysphagia and regurgitation.  She takes Protonix 40 mg daily.  She has previously seen Dr. Alonso but states she does not want to see Dr. Alonso anymore.      Past Medical History:   Diagnosis Date    Asthma     Lung nodule        Past Surgical History:   Procedure Laterality Date    DILATION AND CURETTAGE OF UTERUS N/A 2023    DILATATION AND CURETTAGE HYSTEROSCOPY *MYOSURE DEVICE* performed by Susan Castro MD at [facility] OR    ESOPHAGEAL DILATATION      OTHER SURGICAL HISTORY      as a , attach esophagus, lungs and stomach; numerous surgeries since to fix areas for adhesions per pt.       No Known Allergies    The patient has a family history that is negative for severe cardiovascular or respiratory issues, negative for reaction to anesthesia.   Specifically the patient reported no history of gastrointestinal cancer in his mother or his father to their knowledge.    Time spent reviewing past medical, surgical, social and family history, vitals, nursing assessment and images. No changes from above documented history.    Social History     Socioeconomic History    Marital status: Single     Spouse name: Not on file    Number of children: Not on file    Years of education: Not on file    Highest education level: Not on file   Occupational History    Not on file   Tobacco Use    Smoking status: Never     Passive  transcriptional grammatical or spelling errors that may have escaped my editorial review.    Sreekanth Rico MD  2:56 PM  6/10/2025     Greater than 40 minutes of time was spent with the patient.  This time included chart review, , discussion with those consultants involved in the patient's care and the patient/family.

## 2025-06-10 NOTE — PROGRESS NOTES
transcriptional grammatical or spelling errors that may have escaped my editorial review.    Sreekanth Rico MD  2:56 PM  6/10/2025     Greater than 40 minutes of time was spent with the patient.  This time included chart review, , discussion with those consultants involved in the patient's care and the patient/family.

## 2025-06-11 ENCOUNTER — TELEPHONE (OUTPATIENT)
Dept: SURGERY | Age: 30
End: 2025-06-11

## 2025-06-11 DIAGNOSIS — R13.19 ESOPHAGEAL DYSPHAGIA: ICD-10-CM

## 2025-06-11 NOTE — TELEPHONE ENCOUNTER
Per Dr. Rico, patient schedule for Endoscopy possible dilation on 7/1/25 at [facility]. Patient provided with instruction sheet and follow up appointment. Surgery scheduled via EPIC and surgeon's calendar updated. Dr. Rico to enter orders.    Patient to have [place] UGI (6/20/25) and follow up (6/24/25) prior to EGD.

## 2025-06-20 ENCOUNTER — HOSPITAL ENCOUNTER (OUTPATIENT)
Dept: GENERAL RADIOLOGY | Age: 30
Discharge: HOME | End: 2025-06-22
Attending: SURGERY
Payer: COMMERCIAL

## 2025-06-20 DIAGNOSIS — R13.19 ESOPHAGEAL DYSPHAGIA: ICD-10-CM

## 2025-06-20 PROCEDURE — 2500000003 HC RX 250 WO HCPCS: Performed by: SURGERY

## 2025-06-20 PROCEDURE — 6370000000 HC RX 637 (ALT 250 FOR IP): Performed by: SURGERY

## 2025-06-20 PROCEDURE — 74246 X-RAY XM UPR GI TRC 2CNTRST: CPT

## 2025-06-20 RX ADMIN — BARIUM SULFATE 334 G: 980 POWDER, FOR SUSPENSION ORAL at 08:22

## 2025-06-20 RX ADMIN — BARIUM SULFATE 176 G: 960 POWDER, FOR SUSPENSION ORAL at 08:22

## 2025-06-20 RX ADMIN — ANTACID/ANTIFLATULENT 1 EACH: 380; 550; 10; 10 GRANULE, EFFERVESCENT ORAL at 08:22

## 2025-06-30 RX ORDER — ALBUTEROL SULFATE 90 UG/1
2 INHALANT RESPIRATORY (INHALATION) EVERY 6 HOURS PRN
COMMUNITY

## 2025-06-30 RX ORDER — DROSPIRENONE AND ETHINYL ESTRADIOL 0.02-3(28)
1 KIT ORAL DAILY
COMMUNITY

## 2025-06-30 RX ORDER — CETIRIZINE HYDROCHLORIDE 10 MG/1
10 TABLET ORAL DAILY PRN
COMMUNITY

## 2025-06-30 NOTE — PROGRESS NOTES
[facility]                                                                                                                    PRE OP INSTRUCTIONS FOR  Adela Bliss        Date: 6/30/2025    Date of surgery: 6-30-25   Arrival Time: Hospital will call you on 7-1-25 between 5pm and 7pm with your final arrival time for surgery. Go to     front of hospital and check in at information desk.    Nothing by mouth (NPO) as instructed. May have clear liquids up to 2 hours prior to surgery. Nothing solid after midnight. Examples: water, apple juice, black coffee, plain tea    Take the following medications with a small sip of water on the morning of Surgery: claritin, use and bring inhaler     Diabetics may take half the evening dose of insulin but none after midnight.  If you feel symptomatic or have low blood sugar morning of surgery drink 1-2 ounces of apple juice only. If you take a weekly insulin injection _______________, stop 7 days prior to surgery. If you take _______________, stop 3-4 days prior to surgery.    Aspirin, Ibuprofen, Advil, Naproxen, other Anti-inflammatory products should be stopped before surgery as directed by your surgeon, cardiologist, or primary care Doctor. Herbal supplements and Vitamin E should be stopped five days prior.  May take Tylenol unless instructed otherwise by your surgeon.    Check with your Doctor regarding stopping Plavix, Coumadin, Lovenox, Eliquis, Effient, or other blood thinners such as, pradaxa, lixiana, xaralto and savaysa.    Do not smoke, chew tobacco, vape, or use illicit drugs and do not drink any alcoholic beverages 24 hours prior to surgery.    You may brush your teeth the morning of surgery.      You MUST make arrangements for a responsible adult, 18 and over, to take you home after your surgery. You will not be allowed to leave alone or drive yourself home.  You will need someone stay with you the first 24 hrs. Your surgery will be cancelled if

## 2025-07-01 ENCOUNTER — ANESTHESIA EVENT (OUTPATIENT)
Dept: ENDOSCOPY | Age: 30
End: 2025-07-01
Payer: COMMERCIAL

## 2025-07-01 ENCOUNTER — ANESTHESIA (OUTPATIENT)
Dept: ENDOSCOPY | Age: 30
End: 2025-07-01
Payer: COMMERCIAL

## 2025-07-01 ENCOUNTER — HOSPITAL ENCOUNTER (OUTPATIENT)
Age: 30
Setting detail: OUTPATIENT SURGERY
Discharge: HOME | End: 2025-07-01
Attending: SURGERY | Admitting: SURGERY
Payer: COMMERCIAL

## 2025-07-01 VITALS
TEMPERATURE: 97.1 F | OXYGEN SATURATION: 99 % | WEIGHT: 199 LBS | BODY MASS INDEX: 40.12 KG/M2 | HEIGHT: 59 IN | DIASTOLIC BLOOD PRESSURE: 73 MMHG | SYSTOLIC BLOOD PRESSURE: 112 MMHG | HEART RATE: 76 BPM | RESPIRATION RATE: 16 BRPM

## 2025-07-01 DIAGNOSIS — R13.19 ESOPHAGEAL DYSPHAGIA: ICD-10-CM

## 2025-07-01 LAB
HCG, URINE, POC: NEGATIVE
Lab: NORMAL
NEGATIVE QC PASS/FAIL: NORMAL
POSITIVE QC PASS/FAIL: NORMAL

## 2025-07-01 PROCEDURE — 7100000011 HC PHASE II RECOVERY - ADDTL 15 MIN: Performed by: SURGERY

## 2025-07-01 PROCEDURE — 88305 TISSUE EXAM BY PATHOLOGIST: CPT

## 2025-07-01 PROCEDURE — 3700000001 HC ADD 15 MINUTES (ANESTHESIA): Performed by: SURGERY

## 2025-07-01 PROCEDURE — 3609012400 HC EGD TRANSORAL BIOPSY SINGLE/MULTIPLE: Performed by: SURGERY

## 2025-07-01 PROCEDURE — 3700000000 HC ANESTHESIA ATTENDED CARE: Performed by: SURGERY

## 2025-07-01 PROCEDURE — 3609017700 HC EGD DILATION GASTRIC/DUODENAL STRICTURE: Performed by: SURGERY

## 2025-07-01 PROCEDURE — 43239 EGD BIOPSY SINGLE/MULTIPLE: CPT | Performed by: SURGERY

## 2025-07-01 PROCEDURE — 43249 ESOPH EGD DILATION <30 MM: CPT | Performed by: SURGERY

## 2025-07-01 PROCEDURE — C1726 CATH, BAL DIL, NON-VASCULAR: HCPCS | Performed by: SURGERY

## 2025-07-01 PROCEDURE — 6360000002 HC RX W HCPCS: Performed by: NURSE ANESTHETIST, CERTIFIED REGISTERED

## 2025-07-01 PROCEDURE — 2580000003 HC RX 258: Performed by: SURGERY

## 2025-07-01 PROCEDURE — 2709999900 HC NON-CHARGEABLE SUPPLY: Performed by: SURGERY

## 2025-07-01 PROCEDURE — 7100000010 HC PHASE II RECOVERY - FIRST 15 MIN: Performed by: SURGERY

## 2025-07-01 RX ORDER — SODIUM CHLORIDE 0.9 % (FLUSH) 0.9 %
5-40 SYRINGE (ML) INJECTION EVERY 12 HOURS SCHEDULED
Status: DISCONTINUED | OUTPATIENT
Start: 2025-07-01 | End: 2025-07-01 | Stop reason: HOSPADM

## 2025-07-01 RX ORDER — MIDAZOLAM HYDROCHLORIDE 1 MG/ML
INJECTION, SOLUTION INTRAMUSCULAR; INTRAVENOUS
Status: DISCONTINUED | OUTPATIENT
Start: 2025-07-01 | End: 2025-07-01 | Stop reason: SDUPTHER

## 2025-07-01 RX ORDER — PROPOFOL 10 MG/ML
INJECTION, EMULSION INTRAVENOUS
Status: DISCONTINUED | OUTPATIENT
Start: 2025-07-01 | End: 2025-07-01 | Stop reason: SDUPTHER

## 2025-07-01 RX ORDER — SODIUM CHLORIDE, SODIUM LACTATE, POTASSIUM CHLORIDE, CALCIUM CHLORIDE 600; 310; 30; 20 MG/100ML; MG/100ML; MG/100ML; MG/100ML
INJECTION, SOLUTION INTRAVENOUS CONTINUOUS
Status: DISCONTINUED | OUTPATIENT
Start: 2025-07-01 | End: 2025-07-01 | Stop reason: HOSPADM

## 2025-07-01 RX ORDER — SODIUM CHLORIDE 9 MG/ML
INJECTION, SOLUTION INTRAVENOUS PRN
Status: DISCONTINUED | OUTPATIENT
Start: 2025-07-01 | End: 2025-07-01 | Stop reason: HOSPADM

## 2025-07-01 RX ORDER — LIDOCAINE HYDROCHLORIDE 20 MG/ML
INJECTION, SOLUTION EPIDURAL; INFILTRATION; INTRACAUDAL; PERINEURAL
Status: DISCONTINUED | OUTPATIENT
Start: 2025-07-01 | End: 2025-07-01 | Stop reason: SDUPTHER

## 2025-07-01 RX ORDER — SODIUM CHLORIDE 0.9 % (FLUSH) 0.9 %
5-40 SYRINGE (ML) INJECTION PRN
Status: DISCONTINUED | OUTPATIENT
Start: 2025-07-01 | End: 2025-07-01 | Stop reason: HOSPADM

## 2025-07-01 RX ADMIN — SODIUM CHLORIDE, POTASSIUM CHLORIDE, SODIUM LACTATE AND CALCIUM CHLORIDE: 600; 310; 30; 20 INJECTION, SOLUTION INTRAVENOUS at 10:20

## 2025-07-01 RX ADMIN — PROPOFOL 50 MG: 10 INJECTION, EMULSION INTRAVENOUS at 11:48

## 2025-07-01 RX ADMIN — PROPOFOL 100 MG: 10 INJECTION, EMULSION INTRAVENOUS at 11:45

## 2025-07-01 RX ADMIN — PROPOFOL 50 MG: 10 INJECTION, EMULSION INTRAVENOUS at 11:50

## 2025-07-01 RX ADMIN — MIDAZOLAM 2 MG: 1 INJECTION INTRAMUSCULAR; INTRAVENOUS at 11:45

## 2025-07-01 RX ADMIN — LIDOCAINE HYDROCHLORIDE 100 MG: 20 INJECTION, SOLUTION EPIDURAL; INFILTRATION; INTRACAUDAL; PERINEURAL at 11:45

## 2025-07-01 ASSESSMENT — PAIN - FUNCTIONAL ASSESSMENT: PAIN_FUNCTIONAL_ASSESSMENT: NONE - DENIES PAIN

## 2025-07-01 NOTE — ANESTHESIA POSTPROCEDURE EVALUATION
Department of Anesthesiology  Postprocedure Note    Patient: Adela Bliss  MRN: [id number]  YOB: 1995  Date of evaluation: 7/1/2025    Procedure Summary       Date: 07/01/25 Room / Location: [place] [address] / [facility]    Anesthesia Start: 1140 Anesthesia Stop: 1200    Procedures:       ESOPHAGOGASTRODUODENOSCOPY DILATION BALLOON      ESOPHAGOGASTRODUODENOSCOPY BIOPSY Diagnosis:       Esophageal dysphagia      (Esophageal dysphagia [R13.19])    Surgeons: Sreekanth Rico MD Responsible Provider: Erwin Raman MD    Anesthesia Type: MAC ASA Status: 3            Anesthesia Type: No value filed.    Tony Phase I: Tony Score: 10    Tony Phase II: Tony Score: 10    Anesthesia Post Evaluation    Patient location during evaluation: bedside  Patient participation: complete - patient participated  Level of consciousness: awake  Pain score: 0  Airway patency: patent  Nausea & Vomiting: no vomiting and no nausea  Cardiovascular status: hemodynamically stable  Respiratory status: acceptable  Hydration status: stable  Pain management: adequate        No notable events documented.

## 2025-07-01 NOTE — DISCHARGE INSTRUCTIONS
Discharge Instructions for Upper Endoscopy (EGD)  [facility]  Sreekanth Rico MD  [address]  [phone] (Office)  [phone] (Fax)    The results of your endoscopy and pathology results of biopsies, if taken, will be discussed at your follow up appointment.     After you have an endoscopy, you will stay at the hospital or clinic for 1 to 2 hours. This will allow the medicine to wear off. You will be able to go home after your doctor or nurse checks to make sure you are not having any problems.  You may have to stay overnight if you had treatment during the test. You may have a sore throat for a day or two after the test.  This care sheet gives you a general idea about what to expect after the test.  How can you care for yourself at home?  Activity  Rest as much as you need to after you go home.  You should be able to go back to your usual activities the day after the test.  Diet  Okay to resume your regular diet start with soft foods and advance as tolerated  Drink plenty of fluids.  Medications  If you have a sore throat the day after the test, use an over-the-counter spray to numb your throat.

## 2025-07-01 NOTE — INTERVAL H&P NOTE
Update History & Physical    The patient's History and Physical of Alondra 10, 2025 was reviewed with the patient and I examined the patient. There was no change. The surgical site was confirmed by the patient and me.     Plan: The risks, benefits, expected outcome, and alternative to the recommended procedure have been discussed with the patient. Patient understands and wants to proceed with the procedure.     Electronically signed by Sreekanth Rico MD on 7/1/2025 at 10:34 AM

## 2025-07-01 NOTE — OP NOTE
Operative Note      Patient: Adela Bliss  YOB: 1995  MRN: [id number]    Date of Procedure: 7/1/2025    Pre-Op Diagnosis Codes:      * Esophageal dysphagia [R13.19]    Post-Op Diagnosis: Mid esophageal stricture, rule out Bhakta's       Procedure(s):  ESOPHAGOGASTRODUODENOSCOPY DILATION BALLOON  ESOPHAGOGASTRODUODENOSCOPY BIOPSY    Surgeon(s):  Sreekanth Rico MD    Assistant:   Surgical Assistant: Svitlana Jerry RN    Anesthesia: Monitor Anesthesia Care    Estimated Blood Loss (mL): Minimal    Complications: None    Specimens:   ID Type Source Tests Collected by Time Destination   A : Ge junction bx Tissue Tissue SURGICAL PATHOLOGY Sreekanht Rico MD 7/1/2025 1148        Implants:  * No implants in log *      Drains: * No LDAs found *    Findings:  Infection Present At Time Of Surgery (PATOS) (choose all levels that have infection present):  No infection present  Other Findings: Abnormal GE junction rule out Bhakta's, mid esophageal stricture dilated to 20 mmHg    Detailed Description of Procedure:   The patient was connected to the monitors and given supplemental oxygen by nasal cannula. The patient was sedated.  The gastroscope was inserted orally and advanced under direct vision through the esophagus, through the stomach, through the pylorus, and into the descending duodenum.      Findings:  Duodenum:     Descending: normal    Bulb: normal    Stomach:    Antrum: Mild gastritis    Body: normal    Fundus: normal    Esophagus: Mid esophageal stricture this was dilated with a CRE balloon size 18-20 dilate up to 20mm (6ATM) for 2 minutes x 2.  There was also noted to be abnormal Z-line concerning for Bhakta's esophagus for which biopsies were taken of the abnormal mucosa at the distal esophagus.    Larynx: normal    The scope was removed and the patient tolerated the procedure well.     IMPRESSION/PLAN:   Follow-up in the office in 2 weeks for biopsy results  Discussed with her the

## 2025-07-01 NOTE — ANESTHESIA PRE PROCEDURE
Department of Anesthesiology  Preprocedure Note       Name:  Adela Bliss   Age:  29 y.o.  :  1995                                          MRN:  [id number]         Date:  2025      Surgeon: Surgeon(s):  Sreekanth Rico MD    Procedure: Procedure(s):  EGD POSSIBLE DILATION    Medications prior to admission:   Prior to Admission medications    Medication Sig Start Date End Date Taking? Authorizing Provider   albuterol sulfate HFA (VENTOLIN HFA) 108 (90 Base) MCG/ACT inhaler Inhale 2 puffs into the lungs every 6 hours as needed for Wheezing   Yes ProviderMadhuri MD   cetirizine (ZYRTEC) 10 MG tablet Take 1 tablet by mouth daily as needed for Allergies   Yes Madhuri Longo MD   drospirenone-ethinyl estradiol (EL) 3-0.02 MG per tablet Take 1 tablet by mouth daily   Yes Madhuri Longo MD   acetaminophen (TYLENOL) 500 MG tablet Take 1 tablet by mouth every 6 hours as needed for Pain    Madhuri Longo MD   loratadine (CLARITIN) 10 MG capsule Take 1 capsule by mouth daily as needed    ProviderMadhuri MD       Current medications:    Current Facility-Administered Medications   Medication Dose Route Frequency Provider Last Rate Last Admin    sodium chloride flush 0.9 % injection 5-40 mL  5-40 mL IntraVENous 2 times per day Sreekanth Rico MD        sodium chloride flush 0.9 % injection 5-40 mL  5-40 mL IntraVENous PRN Sreekanth Rico MD        0.9 % sodium chloride infusion   IntraVENous PRN Sreekanth Rico MD        lactated ringers infusion   IntraVENous Continuous Sreekanth Rico  mL/hr at 25 1020 New Bag at 25 1020       Allergies:  No Known Allergies    Problem List:    Patient Active Problem List   Diagnosis Code    Esophageal dysphagia R13.19       Past Medical History:        Diagnosis Date    Asthma     Lung nodule     Prediabetes     Seasonal allergies        Past Surgical History:        Procedure Laterality Date    COLONOSCOPY      DILATION

## 2025-07-03 ENCOUNTER — HOSPITAL ENCOUNTER (OUTPATIENT)
Dept: CT IMAGING | Age: 30
Discharge: HOME | End: 2025-07-05
Payer: COMMERCIAL

## 2025-07-03 DIAGNOSIS — R05.3 CHRONIC COUGH: ICD-10-CM

## 2025-07-03 DIAGNOSIS — J98.4 OTHER DISORDERS OF LUNG: ICD-10-CM

## 2025-07-03 PROCEDURE — 6360000004 HC RX CONTRAST MEDICATION: Performed by: RADIOLOGY

## 2025-07-03 PROCEDURE — 71260 CT THORAX DX C+: CPT

## 2025-07-03 PROCEDURE — 2500000003 HC RX 250 WO HCPCS: Performed by: RADIOLOGY

## 2025-07-03 RX ORDER — SODIUM CHLORIDE 0.9 % (FLUSH) 0.9 %
10 SYRINGE (ML) INJECTION PRN
Status: DISCONTINUED | OUTPATIENT
Start: 2025-07-03 | End: 2025-07-06 | Stop reason: HOSPADM

## 2025-07-03 RX ORDER — IOPAMIDOL 755 MG/ML
75 INJECTION, SOLUTION INTRAVASCULAR
Status: COMPLETED | OUTPATIENT
Start: 2025-07-03 | End: 2025-07-03

## 2025-07-03 RX ADMIN — SODIUM CHLORIDE, PRESERVATIVE FREE 10 ML: 5 INJECTION INTRAVENOUS at 11:33

## 2025-07-03 RX ADMIN — IOPAMIDOL 75 ML: 755 INJECTION, SOLUTION INTRAVENOUS at 11:33

## 2025-07-07 LAB — SURGICAL PATHOLOGY REPORT: NORMAL

## 2025-07-15 ENCOUNTER — OFFICE VISIT (OUTPATIENT)
Dept: SURGERY | Age: 30
End: 2025-07-15
Payer: COMMERCIAL

## 2025-07-15 VITALS
BODY MASS INDEX: 40.12 KG/M2 | SYSTOLIC BLOOD PRESSURE: 122 MMHG | WEIGHT: 199 LBS | HEIGHT: 59 IN | TEMPERATURE: 98.4 F | RESPIRATION RATE: 18 BRPM | HEART RATE: 79 BPM | DIASTOLIC BLOOD PRESSURE: 76 MMHG

## 2025-07-15 DIAGNOSIS — G89.18 POST-OP PAIN: Primary | ICD-10-CM

## 2025-07-15 PROCEDURE — 99212 OFFICE O/P EST SF 10 MIN: CPT | Performed by: SURGERY

## 2025-07-15 NOTE — PROGRESS NOTES
[facility]  Sreekanth Rico MD  [address]  [phone] (Office)  [phone] (Fax)      Patient's Name/Date of Birth: Adela Bliss / 1995    Date: July 15, 2025     Chief compaint: Postop visit from esophageal balloon dilation    Surgeon: Sreekanth Rico MD    Patient Active Problem List   Diagnosis    Esophageal dysphagia       Subjective: Feeling well tolerating diet without dysphagia.    Objective:  /76 (BP Site: Left Upper Arm, Patient Position: Sitting, BP Cuff Size: Large Adult)   Pulse 79   Temp 98.4 °F (36.9 °C) (Temporal)   Resp 18   Ht 1.499 m (4' 11\")   Wt 90.3 kg (199 lb)   LMP 05/21/2025   BMI 40.19 kg/m²     General appearance: AA, NAD  HEENT: NCAT, PERRLA, EOMI  Lungs: Clear, equal rise bilateral  Heart: Reg  Abdomen: Nontender      Pathology: Mild chronic inflammation without intestinal metaplasia or dysplasia    Assessment/Plan:  Adela Bliss is a 29 y.o. female 2 weeks s/p EGD and balloon dilation of esophageal stricture    No issues postoperatively  Follow-up with me for further balloon dilation as needed  Pathology reviewed and copy provided  Follow up as needed    Electronically signed by Sreekanth Rico MD on 7/15/25 at 2:33 PM EDT

## (undated) DEVICE — TUBING, SUCTION, 1/4" X 10', STRAIGHT: Brand: MEDLINE

## (undated) DEVICE — CONTAINER SPEC 480ML CLR POLYSTYR 10% NEUT BUFF FRMLN ZN

## (undated) DEVICE — ELECTRODE ES L11CM DIA3MM BALL TIP BLK DISP UTAHLOOP (MIN BUY OF 10 EACHES)

## (undated) DEVICE — TOWEL,OR,DSP,ST,BLUE,STD,6/PK,12PK/CS: Brand: MEDLINE

## (undated) DEVICE — CONTAINER SPEC COLL 960ML POLYPR TRIANG GRAD INTAKE/OUTPUT

## (undated) DEVICE — BLOCK BITE 60FR CAREGUARD

## (undated) DEVICE — KIT BEDSIDE REVITAL OX 500ML

## (undated) DEVICE — 6 X 9  1.75MIL 4-WALL LABGUARD: Brand: MINIGRIP COMMERCIAL LLC

## (undated) DEVICE — PENCIL ES L3M BTTN SWCH HOLSTER W/ BLDE ELECTRD EDGE

## (undated) DEVICE — GARMENT,MEDLINE,DVT,INT,CALF,MED, GEN2: Brand: MEDLINE

## (undated) DEVICE — FORCEPS BX L240CM JAW DIA2.8MM L CAP W/ NDL MIC MESH TOOTH

## (undated) DEVICE — CLOTH SURG PREP PREOPERATIVE CHLORHEXIDINE GLUC 2% READYPREP

## (undated) DEVICE — MARKER,SKIN,WI/RULER AND LABELS: Brand: MEDLINE

## (undated) DEVICE — SPONGE GZ 4IN 4IN 4 PLY N WVN AVANT

## (undated) DEVICE — CURETTE VACUUM 3.1MM SUCTION ENDOMETRIAL

## (undated) DEVICE — DOUBLE BASIN SET: Brand: MEDLINE INDUSTRIES, INC.

## (undated) DEVICE — DEVICE INFL 60ML 15ATM DISPOSABLE STERIFLATE CRE

## (undated) DEVICE — KENDALL 450 SERIES MONITORING FOAM ELECTRODE - RECTANGULAR SHAPE ( 3/PK): Brand: KENDALL

## (undated) DEVICE — FLUID MANAGEMENT SYSTEM, INTEGRATED TUBE SET, DO NOT USE IF PACKAGE IS DAMAGED, KEEP DRY, KEEP AWAY FROM SUNLIGHT, KEEP AWAY FROM HEAT AND RADIOACTIVE SOURCES: Brand: FLUID SAFE

## (undated) DEVICE — COVER,LIGHT HANDLE,FLX,1/PK: Brand: MEDLINE INDUSTRIES, INC.

## (undated) DEVICE — LUBRICANT SURG JELLY ST BACTER TUBE 4.25OZ

## (undated) DEVICE — VAGINAL PREP TRAY: Brand: MEDLINE INDUSTRIES, INC.

## (undated) DEVICE — ELECTRODE ARTHSCP W10MM D10MM SHFT 11CM YEL MPLR LOOP W/

## (undated) DEVICE — GOWN,SIRUS,NONRNF,SETINSLV,XL,20/CS: Brand: MEDLINE

## (undated) DEVICE — 4-PORT MANIFOLD: Brand: NEPTUNE 2

## (undated) DEVICE — GOWN ISOLATN REG YEL M WT MULTIPLY SIDETIE LEV 2

## (undated) DEVICE — PAD,NON-ADHERENT,3X8,STERILE,LF,1/PK: Brand: MEDLINE

## (undated) DEVICE — GLOVE SURG SZ 65 THK91MIL LTX FREE SYN POLYISOPRENE

## (undated) DEVICE — SYRINGE MED 10ML TRNSLUC BRL PLUNG BLK MRK POLYPR CTRL

## (undated) DEVICE — Device

## (undated) DEVICE — GENERATOR ELECSURG G400 GYRUS] OLYMPUS AMERICA INC]

## (undated) DEVICE — NEEDLE SPNL 22GA L3.5IN BLK HUB S STL REG WALL FIT STYL W/

## (undated) DEVICE — YANKAUER,BULB TIP,W/O VENT,RIGID,STERILE: Brand: MEDLINE

## (undated) DEVICE — MATERNITY PAD,HEAVY: Brand: CURITY

## (undated) DEVICE — ESOPHAGEAL/COLONIC/BILIARY WIREGUIDED BALLOON DILATATION CATHETER: Brand: CRE™ PRO

## (undated) DEVICE — DEVICE TISS REM DIA3MM L25.25IN ENDOSCP F/ IU POLYPS

## (undated) DEVICE — ADAPTER CLEANING PORPOISE CLEANING

## (undated) DEVICE — GAUZE,SPONGE,4"X4",16PLY,XRAY,STRL,LF: Brand: MEDLINE

## (undated) DEVICE — SPECIMEN CUP W/LID: Brand: DEROYAL

## (undated) DEVICE — VALVE SUCTION AIR H2O HYDR H2O JET CONN STRL ORCA POD + DISP

## (undated) DEVICE — SOLUTION IRRIG 3000ML 1.5% GL USP UROMATIC CONT